# Patient Record
Sex: MALE | Race: BLACK OR AFRICAN AMERICAN | NOT HISPANIC OR LATINO | Employment: UNEMPLOYED | URBAN - METROPOLITAN AREA
[De-identification: names, ages, dates, MRNs, and addresses within clinical notes are randomized per-mention and may not be internally consistent; named-entity substitution may affect disease eponyms.]

---

## 2019-03-30 ENCOUNTER — APPOINTMENT (OUTPATIENT)
Dept: RADIOLOGY | Facility: MEDICAL CENTER | Age: 29
End: 2019-03-30
Attending: EMERGENCY MEDICINE
Payer: COMMERCIAL

## 2019-03-30 ENCOUNTER — HOSPITAL ENCOUNTER (EMERGENCY)
Facility: MEDICAL CENTER | Age: 29
End: 2019-03-30
Attending: EMERGENCY MEDICINE
Payer: COMMERCIAL

## 2019-03-30 VITALS
RESPIRATION RATE: 15 BRPM | DIASTOLIC BLOOD PRESSURE: 79 MMHG | SYSTOLIC BLOOD PRESSURE: 135 MMHG | TEMPERATURE: 97.6 F | HEART RATE: 78 BPM | BODY MASS INDEX: 21.48 KG/M2 | HEIGHT: 71 IN | WEIGHT: 153.44 LBS | OXYGEN SATURATION: 98 %

## 2019-03-30 DIAGNOSIS — S61.511A LACERATION OF RIGHT WRIST, INITIAL ENCOUNTER: ICD-10-CM

## 2019-03-30 DIAGNOSIS — S64.11XA INJURY OF RIGHT MEDIAN NERVE AT WRIST, INITIAL ENCOUNTER: ICD-10-CM

## 2019-03-30 PROCEDURE — 303747 HCHG EXTRA SUTURE

## 2019-03-30 PROCEDURE — 304217 HCHG IRRIGATION SYSTEM

## 2019-03-30 PROCEDURE — 99284 EMERGENCY DEPT VISIT MOD MDM: CPT

## 2019-03-30 PROCEDURE — 73100 X-RAY EXAM OF WRIST: CPT | Mod: RT

## 2019-03-30 PROCEDURE — 303485 HCHG DRESSING MEDIUM

## 2019-03-30 PROCEDURE — 700101 HCHG RX REV CODE 250: Performed by: EMERGENCY MEDICINE

## 2019-03-30 PROCEDURE — A9270 NON-COVERED ITEM OR SERVICE: HCPCS | Performed by: EMERGENCY MEDICINE

## 2019-03-30 PROCEDURE — 304999 HCHG REPAIR-SIMPLE/INTERMED LEVEL 1

## 2019-03-30 PROCEDURE — 700102 HCHG RX REV CODE 250 W/ 637 OVERRIDE(OP): Performed by: EMERGENCY MEDICINE

## 2019-03-30 PROCEDURE — 90471 IMMUNIZATION ADMIN: CPT

## 2019-03-30 PROCEDURE — 700111 HCHG RX REV CODE 636 W/ 250 OVERRIDE (IP): Performed by: EMERGENCY MEDICINE

## 2019-03-30 PROCEDURE — 73110 X-RAY EXAM OF WRIST: CPT | Mod: RT

## 2019-03-30 PROCEDURE — 90715 TDAP VACCINE 7 YRS/> IM: CPT | Performed by: EMERGENCY MEDICINE

## 2019-03-30 RX ORDER — CEPHALEXIN 500 MG/1
500 CAPSULE ORAL ONCE
Status: COMPLETED | OUTPATIENT
Start: 2019-03-30 | End: 2019-03-30

## 2019-03-30 RX ORDER — HYDROCODONE BITARTRATE AND ACETAMINOPHEN 5; 325 MG/1; MG/1
1 TABLET ORAL ONCE
Status: COMPLETED | OUTPATIENT
Start: 2019-03-30 | End: 2019-03-30

## 2019-03-30 RX ORDER — CEPHALEXIN 500 MG/1
500 CAPSULE ORAL 4 TIMES DAILY
Qty: 28 CAP | Refills: 0 | Status: SHIPPED | OUTPATIENT
Start: 2019-03-30 | End: 2019-04-06

## 2019-03-30 RX ORDER — LIDOCAINE HYDROCHLORIDE AND EPINEPHRINE 10; 10 MG/ML; UG/ML
10 INJECTION, SOLUTION INFILTRATION; PERINEURAL ONCE
Status: COMPLETED | OUTPATIENT
Start: 2019-03-30 | End: 2019-03-30

## 2019-03-30 RX ADMIN — CLOSTRIDIUM TETANI TOXOID ANTIGEN (FORMALDEHYDE INACTIVATED), CORYNEBACTERIUM DIPHTHERIAE TOXOID ANTIGEN (FORMALDEHYDE INACTIVATED), BORDETELLA PERTUSSIS TOXOID ANTIGEN (GLUTARALDEHYDE INACTIVATED), BORDETELLA PERTUSSIS FILAMENTOUS HEMAGGLUTININ ANTIGEN (FORMALDEHYDE INACTIVATED), BORDETELLA PERTUSSIS PERTACTIN ANTIGEN, AND BORDETELLA PERTUSSIS FIMBRIAE 2/3 ANTIGEN 0.5 ML: 5; 2; 2.5; 5; 3; 5 INJECTION, SUSPENSION INTRAMUSCULAR at 15:52

## 2019-03-30 RX ADMIN — CEPHALEXIN 500 MG: 500 CAPSULE ORAL at 17:11

## 2019-03-30 RX ADMIN — LIDOCAINE HYDROCHLORIDE,EPINEPHRINE BITARTRATE 10 ML: 10; .01 INJECTION, SOLUTION INFILTRATION; PERINEURAL at 15:00

## 2019-03-30 RX ADMIN — HYDROCODONE BITARTRATE AND ACETAMINOPHEN 1 TABLET: 5; 325 TABLET ORAL at 15:21

## 2019-03-30 NOTE — ED NOTES
Agree with triage notes. poc explained to pt.  Xray at bedside. Medicated for pain per mar order. Allergies verified.

## 2019-03-30 NOTE — ED TRIAGE NOTES
"Chief Complaint   Patient presents with   • T-5000 Lacerations     Pt reports that he was cut by a beer bottle while throwing out the trash at work. Lac to right wrist. Pressure dressing in place.  Blood pressure 135/89, pulse 78, temperature 36.4 °C (97.6 °F), temperature source Temporal, resp. rate 14, height 1.803 m (5' 11\"), weight 69.6 kg (153 lb 7 oz), SpO2 98 %.    Pt informed of wait times. Educated on triage process.  Asked to return to triage RN for any new or worsening of symptoms. Thanked for patience.        "

## 2019-03-30 NOTE — LETTER
"  FORM C-4:  EMPLOYEE’S CLAIM FOR COMPENSATION/ REPORT OF INITIAL TREATMENT  EMPLOYEE’S CLAIM - PROVIDE ALL INFORMATION REQUESTED   First Name  Du Last Name  Blane Birthdate             Age  1990 28 y.o. Sex  male Claim Number   Home Employee Address  [address]  [facility]                                     Zip  [address] Height  1.803 m (5' 11\") Weight  69.6 kg (153 lb 7 oz) [facility]     Mailing Employee Address                           [address]   [place]               Zip  [address] Telephone  [phone] (home)  Primary Language Spoken  ENGLISH   Insurer   Third Party   LIDYA/JACQUES SAWYER Employee's Occupation (Job Title) When Injury or Occupational Disease Occurred     Employer's Name  [employer] Telephone  [phone]    Employer Address  [employer] [address] [place] [address] [29] Zip  [address]   Date of Injury  3/30/2019       Hour of Injury  1:45 PM Date Employer Notified  3/30/2019 Last Day of Work after Injury or Occupational Disease  3/30/2019 Supervisor to Whom Injury Reported  Shola Licona   Address or Location of Accident (if applicable)  [[facility] [address]]   What were you doing at the time of accident? (if applicable)  Throwing trash away with glass inside     How did this injury or occupational disease occur? Be specific and answer in detail. Use additional sheet if necessary)  I was cleaning a room and went to take the trash out not noticing the corona bottle at the bottom. As I went to open the trash with on hand and throwing away with the other and   the bottle cut my wrist   If you believe that you have an occupational disease, when did you first have knowledge of the disability and it relationship to your employment?  n/a Witnesses to the Accident  N/A     Nature of Injury or Occupational Disease  Laceration  Part(s) of Body Injured or Affected  Wrist (L) and Hand (L), N/A, N/A    I certify " that the above is true and correct to the best of my knowledge and that I have provided this information in order to obtain the benefits of [place] or [place] of [place]).  I hereby authorize any physician, chiropractor, surgeon, practitioner, or other person, any hospital, including [facility] or [facility], any medical service organization, any insurance company, or other institution or organization to release to each other, any medical or other information, including benefits paid or payable, pertinent to this injury or disease, except information relative to diagnosis, treatment and/or counseling for AIDS, psychological conditions, alcohol or controlled substances, for which I must give specific authorization.  A Photostat of this authorization shall be as valid as the original.   Date 03/30/2019 Place  [facility] Employee’s Signature   THIS REPORT MUST BE COMPLETED AND MAILED WITHIN 3 WORKING DAYS OF TREATMENT   Place  [facility], EMERGENCY DEPT  Name of Facility   [facility]   Date  3/30/2019 Diagnosis  (S61.511A) Laceration of right wrist, initial encounter  (S64.11XA) Injury of right median nerve at wrist, initial encounter Is there evidence the injured employee was under the influence of alcohol and/or another controlled substance at the time of accident?   Hour  6:45 PM Description of Injury or Disease  Laceration of right wrist, initial encounter  Injury of right median nerve at wrist, initial encounter No   Treatment  Update tetanus clean and closed wound.  X-ray to evaluate foreign body.  Removed foreign body.  Closed wound.  Identify possible nerve injury  Have you advised the patient to remain off work five days or more?         No   X-Ray Findings  Positive   If Yes   From Date    To Date      From information given by the employee, together with medical evidence,  "can you directly connect this injury or occupational disease as job incurred?  Yes If No, is the employee capable of: Full Duty  No Modified Duty  Yes   Is additional medical care by a physician indicated?  Yes If Modified Duty, Specify any Limitations / Restrictions  Must keep hand in splint.     Do you know of any previous injury or disease contributing to this condition or occupational disease?  No   Date  3/30/2019 Print Doctor’s Name  Dileep Greenfield certify the employer’s copy of this form was mailed on:   Address  [address] [address] [phone] Insurer’s Use Only   [facility]  [address]    Provider’s Tax ID Number  [id number] Telephone  Dept: [phone]    Doctor’s Signature  e-DILEEP Tolliver M.D. Degree   MD    Original - TREATING PHYSICIAN OR CHIROPRACTOR   Pg 2-Insurer/TPA   Pg 3-Employer   Pg 4-Employee                                                                                                  Form C-4 (rev01/03)     BRIEF DESCRIPTION OF RIGHTS AND BENEFITS  (Pursuant to [place]RS 616C.050)    Notice of Injury or Occupational Disease (Incident Report Form C-1): If an injury or occupational disease (OD) arises out of and in the course of employment, you must provide written notice to your employer as soon as practicable, but no later than 7 days after the accident or OD. Your employer shall maintain a sufficient supply of the required forms.  Claim for Compensation (Form C-4): If medical treatment is sought, the form C-4 is available at the place of initial treatment. A completed \"Claim for Compensation\" (Form C-4) must be filed within 90 days after an accident or OD. The treating physician or chiropractor must, within 3 working days after treatment, complete and mail to the employer, the employer's insurer and third-party , the Claim for Compensation.  Medical Treatment: If you require medical treatment for your on-the-job injury or OD, you may " be required to select a physician or chiropractor from a list provided by your workers’ compensation insurer, if it has contracted with an Organization for Managed Care (MCO) or Preferred Provider Organization (PPO) or providers of health care. If your employer has not entered into a contract with an MCO or PPO, you may select a physician or chiropractor from the Panel of Physicians and Chiropractors. Any medical costs related to your industrial injury or OD will be paid by your insurer.  Temporary Total Disability (TTD): If your doctor has certified that you are unable to work for a period of at least 5 consecutive days, or 5 cumulative days in a 20-day period, or places restrictions on you that your employer does not accommodate, you may be entitled to TTD compensation.  Temporary Partial Disability (TPD): If the wage you receive upon reemployment is less than the compensation for TTD to which you are entitled, the insurer may be required to pay you TPD compensation to make up the difference. TPD can only be paid for a maximum of 24 months.  Permanent Partial Disability (PPD): When your medical condition is stable and there is an indication of a PPD as a result of your injury or OD, within 30 days, your insurer must arrange for an evaluation by a rating physician or chiropractor to determine the degree of your PPD. The amount of your PPD award depends on the date of injury, the results of the PPD evaluation and your age and wage.  Permanent Total Disability (PTD): If you are medically certified by a treating physician or chiropractor as permanently and totally disabled and have been granted a PTD status by your insurer, you are entitled to receive monthly benefits not to exceed 66 2/3% of your average monthly wage. The amount of your PTD payments is subject to reduction if you previously received a PPD award.  Vocational Rehabilitation Services: You may be eligible for vocational rehabilitation services if you are  unable to return to the job due to a permanent physical impairment or permanent restrictions as a result of your injury or occupational disease.  Transportation and Per Renita Reimbursement: You may be eligible for travel expenses and per renita associated with medical treatment.  Reopening: You may be able to reopen your claim if your condition worsens after claim closure.  Appeal Process: If you disagree with a written determination issued by the insurer or the insurer does not respond to your request, you may appeal to the Department of Administration, , by following the instructions contained in your determination letter. You must appeal the determination within 70 days from the date of the determination letter at [address], [place], [place] [address], or [address], [place], [place] [address]. If you disagree with the  decision, you may appeal to the Department of Administration, . You must file your appeal within 30 days from the date of the  decision letter at [address], [place], [place] [address], or [address] [facility] [address], [place] [address]. If you disagree with a decision of an , you may file a petition for judicial review with the District Court. You must do so within 30 days of the Appeal Officer’s decision. You may be represented by an  at your own expense or you may contact the [facility] for possible representation.  [place]  for Injured Workers (NAIW): If you disagree with a  decision, you may request that NAIW represent you without charge at an  Hearing. For information regarding denial of benefits, you may contact the [facility] at: [address], [address], [place] [address], [phone], or [address], [place] [address], [phone]  To File a Complaint with the Division: If  you wish to file a complaint with the  of the Division of Industrial Relations (DIR), please contact the Workers’ Compensation Section, [address], [address], [place], [place] [address], telephone (750) [phone], or [address], [address], [place], [place] [address], telephone [phone]) [phone].  For assistance with Workers’ Compensation Issues: you may contact the Office of the Governor Consumer Health Assistance, [address] [place] [address], [address], [place], [place] [address], Toll Free 1-234.790.3130, Web site: http://1234ENTER.[facility].nv., E-mail [email or web address].[email or web address].                                                                                                                                                                               __________________________________________________________________                                    03/30/2019            Employee Name / Signature                                                                                                                            Date                                       D-2 (rev. 10/07)

## 2019-03-30 NOTE — ED PROVIDER NOTES
"ED Provider Note    Scribed for Dileep Greenfield M.D. by Melina Ortega. 3/30/2019, 2:56 PM.    Primary care provider: None noted  Means of arrival: walk in  History obtained from: patient  History limited by: none    CHIEF COMPLAINT  Chief Complaint   Patient presents with   • T-5000 Lacerations       HPI  Du Arguelles is a 28 y.o. male who presents to the Emergency Department for evaluation of a right wrist laceration that he sustained just prior to arrival. He explains that he was throwing away trash with glass bottles in it while at work and one of the bottles broke and cut his right wrist. There is no associated active bleeding at this time. He has no other medical complaints. He has no history of medical problems or allergies. Patient's tetanus vaccination is not up to date.    REVIEW OF SYSTEMS  Review of Systems   Skin:        Positive for laceration. Negative for active bleeding.      PAST MEDICAL HISTORY       SURGICAL HISTORY  patient denies any surgical history    SOCIAL HISTORY  Social History   Substance Use Topics   • Smoking status: Never Smoker   • Smokeless tobacco: Never Used   • Alcohol use No      History   Drug Use     Comment: marijuana       FAMILY HISTORY  History reviewed. No pertinent family history.    CURRENT MEDICATIONS  No current facility-administered medications for this encounter.     Current Outpatient Prescriptions:   •  cephALEXin (KEFLEX) 500 MG Cap, Take 1 Cap by mouth 4 times a day for 7 days., Disp: 28 Cap, Rfl: 0    ALLERGIES  No Known Allergies    PHYSICAL EXAM  VITAL SIGNS: /89   Pulse 78   Temp 36.4 °C (97.6 °F) (Temporal)   Resp 14   Ht 1.803 m (5' 11\")   Wt 69.6 kg (153 lb 7 oz)   SpO2 98%   BMI 21.40 kg/m²   Vitals reviewed.  Constitutional: Well developed, Well nourished, No acute distress, Non-toxic appearance.   HENT: Normocephalic, Atraumatic  Musculoskeletal: Right volar wrist has about a 3 cm laceration.  Tracks down and through appears to " be the flexor retinaculum of the wrist.  The palmaris longus appears intact.  There may be an area of or tendon injury see the separate and the pelvis and nerve or tendon.  He has normal distal perfusion normal distal tendon function.  He has normal two-point discrimination in the index long ring and small finger but diminished two-point discrimination in the thumb.  Also over the palm near the thumb and over the thenar eminence.  Areas of decreased sensation and abnormal two-point discrimination   Neurologic: Alert, Normal motor function, Normal sensory function, No focal deficits noted.   Psychiatric: Affect normal    RADIOLOGY  DX-WRIST-LIMITED 2- RIGHT   Final Result      No residual right wrist subcutaneous foreign body.      DX-WRIST-COMPLETE 3+ RIGHT   Final Result      1.  No evidence of fracture or dislocation.      2.  1 mm fragment likely representing foreign body identified at site of laceration in the anterior soft tissues of the wrist.        The radiologist's interpretation of all radiological studies have been reviewed by me.    Laceration Repair Procedure Note    Indication: Laceration    Procedure: The patient was placed in the appropriate position and anesthesia around the laceration was obtained by infiltration using 1% Lidocaine with epinephrine. The area was then cleansed with betadine and draped in a sterile fashion, irrigated with normal saline and explored with no foreign bodies discovered. The laceration was closed with 3-0 Ethilon using interrupted sutures. There were no additional lacerations requiring repair. The wound area was then dressed with bacitracin.      Total repaired wound length: 4 cm.     Other Items: Suture count: 5    The patient tolerated the procedure well.    Complications: None      COURSE & MEDICAL DECISION MAKING  Pertinent Labs & Imaging studies reviewed. (See chart for details)    2:56 PM Patient seen and examined at bedside. The patient presents with a laceration  to his right wrist, and the differential diagnosis includes but is not limited to tendon injury, laceration, possible nerve injury.. Ordered for Dx-wrist to evaluate. Patient will be treated with 0.5 ml adacel and one tab of 5-325 norco for his symptoms.      3:53 PM Patient numbed with 1% lidocaine with epinephrine prior to irrigation.    4:20 PM Paged orthopedics.     4:30 PM Spoke to Dr. Oleary (orthopedics) reviewed the possibility of violation of flexor retinaculum.  Possible nerve injury abnormal two-point discrimination possible seen a severed tendon within the wound.  He is aware of this.  He agrees to see the patient as an outpatient recommended close it for now put in a splint antibiotics and arrange follow-up.    X-ray showed a foreign body.  After irrigation and probing and some cleaning.  X-rays obtained after closure there is no residual foreign body.    4:37 PM Patient reevaluated at bedside. He has no new complaints at this time. Explained that he has a nerve injury or possibly a tendon injury and will need to follow up with Dr. Oleary (orthopedics) after he is discharged. Informed that his laceration will be repaired at this time. Patient understands and agrees to plan of care. Ordered repeat Dx-wrist.    5    6:23 PM Patient reevaluated at bedside. Explained that he will be discharged at this time. Advised to follow up with Orthopedics as soon as possible. Patient understands and agrees to be discharged at this time.     This was an on-the-job injury.  Patient was also referred to occupational health    The patient will return for new or worsening symptoms and is stable at the time of discharge.  The patient is referred to a primary physician for blood pressure management, diabetic screening, and for all other preventative health concerns.    DISPOSITION:  Patient will be discharged home in stable condition.    FOLLOW UP:  [address]  [address]  [place]  [place] [address]  [phone]  Schedule an appointment as soon as possible for a visit in 2 days      Memo Oleary M.D.  [address] [place] [address]  [phone]    Schedule an appointment as soon as possible for a visit in 2 days        OUTPATIENT MEDICATIONS:  New Prescriptions    CEPHALEXIN (KEFLEX) 500 MG CAP    Take 1 Cap by mouth 4 times a day for 7 days.     FINAL IMPRESSION  1. Laceration of right wrist, initial encounter    2. Injury of right median nerve at wrist, initial encounter          IMelina (Scribe), am scribing for, and in the presence of, Dileep Greenfield M.D..    Electronically signed by: Melina Ortega (Scribe), 3/30/2019    IDileep M.D. personally performed the services described in this documentation, as scribed by Melina Ortega in my presence, and it is both accurate and complete. E    The note accurately reflects work and decisions made by me.  Dileep Greenfield  3/30/2019  10:00 PM

## 2019-03-31 NOTE — DISCHARGE INSTRUCTIONS
Call Dr. Price for follow-up at [phone].  He likely have an injury to your nerve and possibly a tendon.  He will need further care and probably surgery.  Also, follow-up occupational health.  Return for pain swelling redness or other concerns.  Take antibiotics as prescribed.  Keep clean dry and dressed and in a splint.

## 2019-03-31 NOTE — ED NOTES
Laceration in right wrist repaired by erp. Bleeding controlled.  Splint applied by ed tech in right wrist/arm. CMS intact.  Discharge instructions given to pt including follow up with orthopaedic doctor and [facility] or returning if no improvement of symptoms or to return if worse. Prescription x 1 provided to pt. Questions answered by RN. Denies any new complaints. Discharged w/stable vitals and able to ambulate w/steady gait.

## 2019-04-25 ENCOUNTER — HOSPITAL ENCOUNTER (EMERGENCY)
Facility: MEDICAL CENTER | Age: 29
End: 2019-04-25
Payer: COMMERCIAL

## 2019-04-25 VITALS
DIASTOLIC BLOOD PRESSURE: 71 MMHG | OXYGEN SATURATION: 99 % | HEART RATE: 82 BPM | WEIGHT: 151.9 LBS | SYSTOLIC BLOOD PRESSURE: 160 MMHG | TEMPERATURE: 98.6 F | BODY MASS INDEX: 21.19 KG/M2 | RESPIRATION RATE: 14 BRPM

## 2019-04-25 NOTE — ED TRIAGE NOTES
PT was coming for a follow up for a work related injury, pt was trying to be seen at occupational health, pt given number and directions to [facility]

## 2019-05-06 ENCOUNTER — TELEPHONE (OUTPATIENT)
Dept: OCCUPATIONAL MEDICINE | Facility: CLINIC | Age: 29
End: 2019-05-06

## 2019-05-07 ENCOUNTER — OCCUPATIONAL MEDICINE (OUTPATIENT)
Dept: OCCUPATIONAL MEDICINE | Facility: CLINIC | Age: 29
End: 2019-05-07
Payer: COMMERCIAL

## 2019-05-07 VITALS
RESPIRATION RATE: 14 BRPM | WEIGHT: 151 LBS | SYSTOLIC BLOOD PRESSURE: 120 MMHG | DIASTOLIC BLOOD PRESSURE: 72 MMHG | OXYGEN SATURATION: 100 % | TEMPERATURE: 98.1 F | HEIGHT: 71 IN | BODY MASS INDEX: 21.14 KG/M2 | HEART RATE: 61 BPM

## 2019-05-07 DIAGNOSIS — S61.511S LACERATION OF RIGHT WRIST, SEQUELA: ICD-10-CM

## 2019-05-07 PROCEDURE — 99204 OFFICE O/P NEW MOD 45 MIN: CPT | Performed by: PREVENTIVE MEDICINE

## 2019-05-07 NOTE — LETTER
[address],   [address] [place] [address]  Phone:  [phone] - Fax:  [phone]   [facility] Progress Report and Disability Certification  Date of Service: 5/7/2019   No Show:  No  Date / Time of Next Visit: 5/21/2019 @ 11:15 AM   Claim Information   Patient Name: Du Arguelles  Claim Number:     Employer: [employer]  Date of Injury: 3/30/2019     Insurer / TPA: Lois Toledo  ID / SSN:     Occupation:   Diagnosis: The encounter diagnosis was Laceration of left wrist, sequela.    Medical Information   Related to Industrial Injury? Yes    Subjective Complaints:  DOI 3/30/2019: 29 yo male presents with left wrist laceration. He was throwing away trash with glass bottles in it while at work and one of the bottles broke and cut his right wrist.  He was seen in the emergency department, hand surgery was consulted and advised closing the laceration follow-up with hand surgery.  Patient states there was miscommunication he is not followed up with hand surgery yet.  He notes some tenderness over the laceration.  He notes occasional shooting pain up into the third digit and sometimes to the base of the thumb.  He also sometimes get numbness/tingling in this area as well.  Patient states that his right hand feels weak.  He has been using a wrist brace most of the day to help with pain.  Notes increased pain with frequent use of the hand.  He states when he wakes in the morning it feels stiff.  Denies prior left wrist injuries.   Objective Findings: Left wrist: Well-healed laceration over the volar wrist, near the carpal retinaculum.  Full range of motion of wrist and all digits.  Tenderness over the volar wrist.   strength slightly diminished.  Some pain with resisted pronation and wrist flexion/extension.  Tinel's positive.  Phalen's positive.   Pre-Existing Condition(s):     Assessment:   Condition Same    Status:  Additional Care Required  Permanent Disability:No    Plan:      Diagnostics:      Comments:  Given symptoms and description of laceration in the ER note recommend follow-up with hand surgery  Placed referral for hand surgery  Use wrist brace as needed  OTC ibuprofen as needed  Restricted duty  Follow-up 2 weeks    Disability Information   Status: Released to Restricted Duty    From:  5/7/2019  Through: 5/21/2019 Restrictions are: Temporary   Physical Restrictions   Sitting:    Standing:    Stooping:    Bending:      Squatting:    Walking:    Climbing:    Pushing:      Pulling:    Other:    Reaching Above Shoulder (L):   Reaching Above Shoulder (R):       Reaching Below Shoulder (L):    Reaching Below Shoulder (R):      Not to exceed Weight Limits   Carrying(hrs):   Weight Limit(lb):   Lifting(hrs):   Weight  Limit(lb):     Comments: Less than 5 pounds lift/push/pull with right hand.  Limited forceful gripping, grasping and pinching.    Repetitive Actions   Hands: i.e. Fine Manipulations from Grasping:     Feet: i.e. Operating Foot Controls:     Driving / Operate Machinery:     Physician Name: Octavio Mondragon D.O. Physician Signature: OCTAVIO Greenwood D.O. e-Signature: Dr. Josh Solorio, Medical Director   Clinic Name / Location: [address],   [address] [place] [address] Clinic Phone Number: Dept: [phone]   Appointment Time: 1:30 Pm Visit Start Time: 1:56 PM   Check-In Time:  1:50 Pm Visit Discharge Time:  2:23 PM   Original-Treating Physician or Chiropractor    Page 2-Insurer/TPA    Page 3-Employer    Page 4-Employee

## 2019-05-20 ENCOUNTER — TELEPHONE (OUTPATIENT)
Dept: OCCUPATIONAL MEDICINE | Facility: CLINIC | Age: 29
End: 2019-05-20

## 2019-05-21 ENCOUNTER — OCCUPATIONAL MEDICINE (OUTPATIENT)
Dept: OCCUPATIONAL MEDICINE | Facility: CLINIC | Age: 29
End: 2019-05-21
Payer: COMMERCIAL

## 2019-05-21 VITALS
BODY MASS INDEX: 21.14 KG/M2 | HEIGHT: 71 IN | WEIGHT: 151 LBS | HEART RATE: 81 BPM | DIASTOLIC BLOOD PRESSURE: 80 MMHG | OXYGEN SATURATION: 100 % | RESPIRATION RATE: 14 BRPM | TEMPERATURE: 97.9 F | SYSTOLIC BLOOD PRESSURE: 120 MMHG

## 2019-05-21 DIAGNOSIS — S61.511S LACERATION OF RIGHT WRIST, SEQUELA: ICD-10-CM

## 2019-05-21 PROCEDURE — 99213 OFFICE O/P EST LOW 20 MIN: CPT | Performed by: PREVENTIVE MEDICINE

## 2019-05-21 ASSESSMENT — ENCOUNTER SYMPTOMS
TINGLING: 1
SENSORY CHANGE: 1

## 2019-05-21 NOTE — PROGRESS NOTES
"Subjective:      Du Arguelles is a 28 y.o. male who presents with Other ([facility] DOI 3/30/19 (R) Wrist- feeling the same)      DOI 3/30/2019: 29 yo male presents with left wrist laceration. He was throwing away trash with glass bottles in it while at work and one of the bottles broke and cut his right wrist. Patient states that overall right wrist pain is about the same.  Pain continues to be mostly on the volar aspect.  He states that he tried using the brace less which made the wrist less stiff but increase the numbness and tingling.  Numbness and tingling is worse with frequent use of the hand.  He is in the process of setting up the appointment with Dr. Ernst.     HPI    Review of Systems   Neurological: Positive for tingling and sensory change.     SOCHX: Works as a  at [employer]   FH: No pertinent family history to this problem.     Objective:     /80   Pulse 81   Temp 36.6 °C (97.9 °F)   Resp 14   Ht 1.803 m (5' 11\")   Wt 68.5 kg (151 lb)   SpO2 100%   BMI 21.06 kg/m²      Physical Exam   Constitutional: He is oriented to person, place, and time. He appears well-developed and well-nourished.   Cardiovascular: Normal rate.    Pulmonary/Chest: Effort normal.   Neurological: He is alert and oriented to person, place, and time.   Skin: Skin is warm and dry.   Psychiatric: He has a normal mood and affect. His behavior is normal. Judgment normal.       Left wrist: Well-healed laceration over the volar wrist, near the carpal retinaculum.  Full range of motion of wrist and all digits.  Tenderness over the volar wrist.         Assessment/Plan:     1. Laceration of right wrist, sequela  Make appointment Dr. Ernst  Use wrist brace as needed  OTC ibuprofen as needed  Follow-up as needed  Restricted duty until cleared by orthopedics        "

## 2019-05-21 NOTE — LETTER
[address],   [address] [place] [address]  Phone:  [phone] - Fax:  [phone]   Occupational Health [facility] Progress Report and Disability Certification  Date of Service: 5/21/2019   No Show:  No  Date / Time of Next Visit:  [facility]   Claim Information   Patient Name: Du Arguelles  Claim Number:     Employer: [employer]  Date of Injury: 3/30/2019     Insurer / TPA: [employer] [place]  ID / SSN:     Occupation:   Diagnosis: The encounter diagnosis was Laceration of right wrist, sequela.    Medical Information   Related to Industrial Injury? Yes    Subjective Complaints:  DOI 3/30/2019: 27 yo male presents with left wrist laceration. He was throwing away trash with glass bottles in it while at work and one of the bottles broke and cut his right wrist. Patient states that overall right wrist pain is about the same.  Pain continues to be mostly on the volar aspect.  He states that he tried using the brace less which made the wrist less stiff but increase the numbness and tingling.  Numbness and tingling is worse with frequent use of the hand.  He is in the process of setting up the appointment with Dr. Ernst.   Objective Findings: Left wrist: Well-healed laceration over the volar wrist, near the carpal retinaculum.  Full range of motion of wrist and all digits.  Tenderness over the volar wrist.     Pre-Existing Condition(s):     Assessment:   Condition Same    Status: Discharged / Care Transfer  Permanent Disability:No    Plan:      Diagnostics:      Comments:  Make appointment Dr. Ernst  Use wrist brace as needed  OTC ibuprofen as needed  Follow-up as needed  Restricted duty until cleared by orthopedics    Disability Information   Status: Released to Restricted Duty    From:  5/21/2019  Through:   Restrictions are: Temporary   Physical Restrictions   Sitting:    Standing:    Stooping:    Bending:      Squatting:   Walking:    Climbing:    Pushing:      Pulling:    Other:    Reaching Above Shoulder (L):   Reaching Above Shoulder (R):       Reaching Below Shoulder (L):    Reaching Below Shoulder (R):      Not to exceed Weight Limits   Carrying(hrs):   Weight Limit(lb):   Lifting(hrs):   Weight  Limit(lb):     Comments: Less than 5 pounds lift/push/pull with right hand.  Limited forceful gripping, grasping and pinching.  Until cleared by orthopedics    Repetitive Actions   Hands: i.e. Fine Manipulations from Grasping:     Feet: i.e. Operating Foot Controls:     Driving / Operate Machinery:     Physician Name: Octavio Mondragon D.O. Physician Signature: OCTAVIO Greenwood D.O. e-Signature: Dr. Josh Solorio, Medical Director   Clinic Name / Location: [address],   [address] [place] [id number] Clinic Phone Number: Dept: [phone]   Appointment Time: 11:15 Am Visit Start Time: 11:23 AM   Check-In Time:  11:21 Am Visit Discharge Time: 11:34 AM   Original-Treating Physician or Chiropractor    Page 2-Insurer/TPA    Page 3-Employer    Page 4-Employee

## 2020-04-06 ENCOUNTER — HOSPITAL ENCOUNTER (EMERGENCY)
Facility: MEDICAL CENTER | Age: 30
End: 2020-04-06
Attending: EMERGENCY MEDICINE
Payer: COMMERCIAL

## 2020-04-06 VITALS
DIASTOLIC BLOOD PRESSURE: 83 MMHG | RESPIRATION RATE: 16 BRPM | HEART RATE: 102 BPM | TEMPERATURE: 98.3 F | HEIGHT: 70 IN | SYSTOLIC BLOOD PRESSURE: 141 MMHG | BODY MASS INDEX: 21.43 KG/M2 | WEIGHT: 149.69 LBS | OXYGEN SATURATION: 97 %

## 2020-04-06 DIAGNOSIS — R36.9 PENILE DISCHARGE: ICD-10-CM

## 2020-04-06 LAB
APPEARANCE UR: CLEAR
BACTERIA #/AREA URNS HPF: NEGATIVE /HPF
BILIRUB UR QL STRIP.AUTO: NEGATIVE
COLOR UR: YELLOW
EPI CELLS #/AREA URNS HPF: NEGATIVE /HPF
GLUCOSE UR STRIP.AUTO-MCNC: NEGATIVE MG/DL
HYALINE CASTS #/AREA URNS LPF: ABNORMAL /LPF
KETONES UR STRIP.AUTO-MCNC: NEGATIVE MG/DL
LEUKOCYTE ESTERASE UR QL STRIP.AUTO: ABNORMAL
MICRO URNS: ABNORMAL
NITRITE UR QL STRIP.AUTO: NEGATIVE
PH UR STRIP.AUTO: 5.5 [PH] (ref 5–8)
PROT UR QL STRIP: NEGATIVE MG/DL
RBC # URNS HPF: ABNORMAL /HPF
RBC UR QL AUTO: NEGATIVE
SP GR UR STRIP.AUTO: 1.02
UROBILINOGEN UR STRIP.AUTO-MCNC: 1 MG/DL
WBC #/AREA URNS HPF: ABNORMAL /HPF

## 2020-04-06 PROCEDURE — 99284 EMERGENCY DEPT VISIT MOD MDM: CPT

## 2020-04-06 PROCEDURE — A9270 NON-COVERED ITEM OR SERVICE: HCPCS | Performed by: EMERGENCY MEDICINE

## 2020-04-06 PROCEDURE — 87491 CHLMYD TRACH DNA AMP PROBE: CPT

## 2020-04-06 PROCEDURE — 700111 HCHG RX REV CODE 636 W/ 250 OVERRIDE (IP): Performed by: EMERGENCY MEDICINE

## 2020-04-06 PROCEDURE — 700101 HCHG RX REV CODE 250: Performed by: EMERGENCY MEDICINE

## 2020-04-06 PROCEDURE — 700102 HCHG RX REV CODE 250 W/ 637 OVERRIDE(OP): Performed by: EMERGENCY MEDICINE

## 2020-04-06 PROCEDURE — 87591 N.GONORRHOEAE DNA AMP PROB: CPT

## 2020-04-06 PROCEDURE — 96372 THER/PROPH/DIAG INJ SC/IM: CPT

## 2020-04-06 PROCEDURE — 81001 URINALYSIS AUTO W/SCOPE: CPT

## 2020-04-06 RX ORDER — CEFTRIAXONE SODIUM 250 MG/1
250 INJECTION, POWDER, FOR SOLUTION INTRAMUSCULAR; INTRAVENOUS ONCE
Status: COMPLETED | OUTPATIENT
Start: 2020-04-06 | End: 2020-04-06

## 2020-04-06 RX ORDER — AZITHROMYCIN 250 MG/1
1000 TABLET, FILM COATED ORAL ONCE
Status: COMPLETED | OUTPATIENT
Start: 2020-04-06 | End: 2020-04-06

## 2020-04-06 RX ADMIN — LIDOCAINE HYDROCHLORIDE 0.9 ML: 10 INJECTION, SOLUTION INFILTRATION; PERINEURAL at 14:01

## 2020-04-06 RX ADMIN — CEFTRIAXONE SODIUM 250 MG: 250 INJECTION, POWDER, FOR SOLUTION INTRAMUSCULAR; INTRAVENOUS at 14:01

## 2020-04-06 RX ADMIN — AZITHROMYCIN MONOHYDRATE 1000 MG: 250 TABLET ORAL at 14:01

## 2020-04-06 ASSESSMENT — LIFESTYLE VARIABLES: DO YOU DRINK ALCOHOL: NO

## 2020-04-06 NOTE — ED NOTES
Pt amb to 75 with steady gait, UA obtained and sent. Pt educated on POC and verbalized understanding.

## 2020-04-06 NOTE — DISCHARGE INSTRUCTIONS
Please follow-up with the [facility] above.  Call to schedule a follow-up appointment for complete recheck.  Please abstain from sexual intercourse for 7 days, and make sure that your symptoms completely resolved.  Return to the emergency department if you develop any new or worsening symptoms including pelvic pain, worsening discharge despite antibiotic therapy, fevers, burning with urination, or if you have any further concerns.

## 2020-04-06 NOTE — ED NOTES
Medicated per MAR.    Pt discharge to home.  Pt provided with discharge instructions.  Pt verbalized understanding, all questions answered.  VSS upon DC. Pt steady on feet upon DC.

## 2020-04-06 NOTE — LETTER
4/8/2020               Du Arguelles  [address]  [address]  [facility] [place] [facility] [address]        Dear Du (MR#[id number])    As we have been unable to contact you by phone, this letter is sent in regards to your, recent visit to the [facility] Emergency Department on 4/6/2020.  During the visit, tests were performed to assist the physician in a medical diagnosis.  A review of those tests requires that we notify you of the following:    Your culture test was positive for Gonorrhea, a sexually transmitted infection. This was already treated appropriately in the Emergency Department. .       Based on the above findings it is recommended that you avoid sexual activity until 7 days after treatment, and seek testing for the presence of additional sexually transmitted infections from the Health Department. Also, it is advised that you inform your sexual partner(s) within the previous 60 days of the above findings and direct them to the Health Department for testing. Should your symptoms progress, it is important that you follow up with your primary care physician, your local urgent care office, or return to the emergency department for further work up in order to prevent long term health issues.      Thank you for your cooperation in the matter.    Sincerely,  ED Culture Follow-Up Staff  Stefany Ferreira, PharmD, PharmD    [facility], Emergency Department  [address], [place] [address] [phone] (ED Culture Line)  [phone]

## 2020-04-06 NOTE — ED PROVIDER NOTES
"ED Provider Note    Chief Complaint:   Penile discharge    HPI:  Du Arguelles is a 29 y.o. male who presents with chief complaint of penile discomfort and discharge.  After urinating earlier today he noticed some mild discomfort.  He denies burning with urination, denies abnormal urine.  Mild discomfort resolved without intervention, however later in the day he noticed a small amount of abnormal discharge.  Denies testicular pain or swelling, denies penile swelling.  He is concerned that he may have contracted a sexually transmitted infection from a new partner.  He does know for certain if any of his partners were recently diagnosed with sexually transmitted infections.    He denies any abnormal rashes or lesions, denies fevers, denies any painless ulcers or painless rash.  He has no significant past medical history.  Reports no other concerns at this time.    Review of Systems:  See HPI for pertinent positives and negatives.     Past Medical History:       Social History:  Social History     Tobacco Use   • Smoking status: Never Smoker   • Smokeless tobacco: Never Used   Substance and Sexual Activity   • Alcohol use: Yes     Comment: raree   • Drug use: Yes     Types: Inhaled     Comment: marijuana   • Sexual activity: Not on file       Surgical History:  patient denies any surgical history    Current Medications:  Home Medications     Reviewed by Kathe Nur R.N. (Registered Nurse) on 04/06/20 at 1317  Med List Status: Complete   Medication Last Dose Status        Patient Luis Taking any Medications                       Allergies:  No Known Allergies    Physical Exam:  Vital Signs: /83   Pulse (!) 102   Temp 36.8 °C (98.3 °F) (Temporal)   Resp 16   Ht 1.778 m (5' 10\")   Wt 67.9 kg (149 lb 11.1 oz)   SpO2 97%   BMI 21.48 kg/m²   Constitutional: Alert, no acute distress  HENT: Normocephalic  Pulmonary: No respiratory distress, normal work of breathing  Abdomen: Nondistended  Skin: " Warm, dry, no visible rashes or lesions  Musculoskeletal: Normal range of motion in all extremities, no swelling or deformity noted  Neurologic: Alert, oriented, normal speech, normal motor function  Psychiatric: Normal and appropriate mood and affect    Medical records reviewed for continuity of care.  No recent visits for similar symptoms.    Labs:  Labs Reviewed   URINALYSIS - Abnormal; Notable for the following components:       Result Value    Leukocyte Esterase Moderate (*)     All other components within normal limits   URINE MICROSCOPIC (W/UA) - Abnormal; Notable for the following components:    WBC  (*)     RBC 0-2 (*)     All other components within normal limits   CHLAMYDIA/GC PCR URINE OR SWAB     ED Medications Administered:  Medications   cefTRIAXone (ROCEPHIN) injection 250 mg (250 mg Intramuscular Given 4/6/20 1401)   azithromycin (ZITHROMAX) tablet 1,000 mg (1,000 mg Oral Given 4/6/20 1401)   lidocaine (XYLOCAINE) 1 % injection 0.9 mL (0.9 mL Other Given 4/6/20 1401)     MDM:  Patient presents with symptoms consistent with sexually transmitted infection.  Urinalysis is negative for bacteria, positive for white blood cells which would also support sexually transmitted infection.  He was treated with Rocephin and azithromycin empirically.  GC and chlamydia testing is pending at this time.  He is counseled to abstain from sexual intercourse for 7 days and to verify that all symptoms have resolved.  He is counseled to follow-up with his primary care physician for complete recheck.  Return precautions were discussed with the patient, and provided in written form with the patient's discharge instructions.     This patient was not identified to have risk factors for COVID-19.  Personal protective equipment including N95 surgical respirator, goggles, and gloves were used during this encounter.     Blood pressure today is greater than 120/80, patient is instructed to follow up with primary care  provider for blood pressure recheck.    Disposition:  Discharge home in stable condition    Final Impression:  1. Penile discharge        Electronically signed by: Melina Ramirez MD, 4/6/2020 2:56 PM

## 2020-04-06 NOTE — ED TRIAGE NOTES
"Chief Complaint   Patient presents with   • Penile Discharge     Pt states he noticed discharge today.     /83   Pulse (!) 102   Temp 36.8 °C (98.3 °F) (Temporal)   Resp 16   Ht 1.778 m (5' 10\")   Wt 67.9 kg (149 lb 11.1 oz)   SpO2 97%   BMI 21.48 kg/m²     Pt ambulated into triage, mask in place. VS as above, NAD, encouraged to return to the triage nurse or tech with any new complaints or symptoms. Urine cup provided.  "

## 2020-04-07 LAB
C TRACH DNA SPEC QL NAA+PROBE: NEGATIVE
N GONORRHOEA DNA SPEC QL NAA+PROBE: POSITIVE
SPECIMEN SOURCE: ABNORMAL

## 2020-04-08 NOTE — ED NOTES
"ED Positive Culture Follow-up/Notification Note:    Date: 4/8/2020     Patient seen in the ED on 4/6/2020 for penile discharge.   1. Penile discharge       There are no discharge medications for this patient.  Ceftriaxone and azith administered in the ED.     Allergies: Patient has no known allergies.     Vitals:    04/06/20 1314 04/06/20 1316   BP: 141/83    Pulse: (Abnormal) 102    Resp: 16    Temp: 36.8 °C (98.3 °F)    TempSrc: Temporal    SpO2: 97%    Weight:  67.9 kg (149 lb 11.1 oz)   Height: 1.778 m (5' 10\")        Final cultures:   Results     Procedure Component Value Units Date/Time    Chlamydia/GC PCR Urine Or Swab [[id number]]  (Abnormal) Collected:  04/06/20 1331    Order Status:  Completed Specimen:  Genital from Urine, First Catch Updated:  04/07/20 1748     C. trachomatis by PCR Negative     N. gonorrhoeae by PCR POSITIVE     Source Urine    URINALYSIS [[id number]]  (Abnormal) Collected:  04/06/20 1331    Order Status:  Completed Specimen:  Genital from Urine, Clean Catch Updated:  04/06/20 1449     Color Yellow     Character Clear     Specific Gravity 1.022     Ph 5.5     Glucose Negative mg/dL      Ketones Negative mg/dL      Protein Negative mg/dL      Bilirubin Negative     Urobilinogen, Urine 1.0     Nitrite Negative     Leukocyte Esterase Moderate     Occult Blood Negative     Micro Urine Req Microscopic          Plan:   Isolated Gonorrhea appropriately treated in the ED. Unable to reach patient by phone, sent letter to inform of result,  on abstinence for 7 days after treatment, inform partners within the past 60 days and seeking additional STD screening.     Stefany Ferreira, PharmD    "

## 2022-10-06 ENCOUNTER — HOSPITAL ENCOUNTER (EMERGENCY)
Facility: MEDICAL CENTER | Age: 32
End: 2022-10-06
Attending: EMERGENCY MEDICINE
Payer: COMMERCIAL

## 2022-10-06 VITALS
BODY MASS INDEX: 22.31 KG/M2 | HEIGHT: 72 IN | OXYGEN SATURATION: 100 % | RESPIRATION RATE: 18 BRPM | WEIGHT: 164.68 LBS | HEART RATE: 65 BPM | SYSTOLIC BLOOD PRESSURE: 122 MMHG | DIASTOLIC BLOOD PRESSURE: 71 MMHG | TEMPERATURE: 97.8 F

## 2022-10-06 DIAGNOSIS — Z20.2 STD EXPOSURE: ICD-10-CM

## 2022-10-06 LAB
HIV 1+2 AB+HIV1 P24 AG SERPL QL IA: NORMAL
T PALLIDUM AB SER QL IA: NORMAL

## 2022-10-06 PROCEDURE — 86780 TREPONEMA PALLIDUM: CPT

## 2022-10-06 PROCEDURE — 99283 EMERGENCY DEPT VISIT LOW MDM: CPT

## 2022-10-06 PROCEDURE — 87389 HIV-1 AG W/HIV-1&-2 AB AG IA: CPT

## 2022-10-06 RX ORDER — METRONIDAZOLE 500 MG/1
500 TABLET ORAL 2 TIMES DAILY
Qty: 14 TABLET | Refills: 0 | Status: SHIPPED | OUTPATIENT
Start: 2022-10-06 | End: 2022-10-13

## 2022-10-06 NOTE — ED NOTES
Patient seen at bedside; they have no new complaints at this time and vital signs are stable. Patient given discharge paperwork and given opportunity to ask questions. Patient verbalized understanding of discharge instructions and return precautions. Patient ambulated from ED with steady gait.

## 2022-10-06 NOTE — ED NOTES
Patient ambulated from lobby with steady gait. Agree with triage assessment.     Chart up for ERP to see.

## 2022-10-06 NOTE — ED TRIAGE NOTES
"Chief Complaint   Patient presents with    Exposure to STD     Pt was diagnosed w/ \"trichomoniasis\" 4 months ago while w/ another partner and completed the full course of antibiotic. Pt is now w/ a new partner who is having \"std symptoms\". Pt does not have symptoms but is concerned that he infected his new partner.     BP (!) 149/84   Pulse 85   Temp 36.6 °C (97.8 °F) (Temporal)   Resp 17   Ht 1.829 m (6')   Wt 74.7 kg (164 lb 10.9 oz)   SpO2 99%   BMI 22.34 kg/m²     "

## 2022-10-06 NOTE — ED PROVIDER NOTES
"ED Provider Note    Scribed for Monroe Murdock M.D. by Yanique Carr. 10/6/2022  11:13 AM    Primary care provider: Pcp Not In Computer  Means of arrival: Walk in   History obtained from: Patient  History limited by: None    CHIEF COMPLAINT  Chief Complaint   Patient presents with    Exposure to STD     Pt was diagnosed w/ \"trichomoniasis\" 4 months ago while w/ another partner and completed the full course of antibiotic. Pt is now w/ a new partner who is having \"std symptoms\". Pt does not have symptoms but is concerned that he infected his new partner.       HPI  Du Arguelles is a 31 y.o. male who presents to the Emergency Department for evaluation of exposure to STD. Patient reports that his partner was diagnosed with trichomoniasis 4 months ago, and she finished the full course of antibiotics. Patient's partner was doing well following the antibiotics. At this time, Du was also given antibiotics (Flagyl). He finished the full course of the antibiotics. However, about three days ago, Du's partner started experiencing STD-like symptoms again including foul odorous discharge. She was seen in the ED three days ago and had a lab work up done. Patient endorses being sexually active with her recently. However, he is not experiencing symptoms. No alleviating factors were reported.      Review of Records: Patient's partner was seen in the ED on September 20th. She is BV positive and trichomoniasis negative.     REVIEW OF SYSTEMS  Pertinent negatives include no dysuria, back pain, or abdominal pain.   See HPI for further details.     PAST MEDICAL HISTORY  None noted.     SURGICAL HISTORY  patient denies any surgical history    SOCIAL HISTORY  Social History     Tobacco Use    Smoking status: Never    Smokeless tobacco: Never   Vaping Use    Vaping Use: Never used   Substance Use Topics    Alcohol use: Yes     Comment: raree    Drug use: Yes     Types: Inhaled     Comment: marijuana      Social History "     Substance and Sexual Activity   Drug Use Yes    Types: Inhaled    Comment: marijuana       FAMILY HISTORY  History reviewed. No pertinent family history.    CURRENT MEDICATIONS  Home Medications       Reviewed by Shola Quinones R.N. (Registered Nurse) on 10/06/22 at 1035  Med List Status: Partial     Medication Last Dose Status        Patient Luis Taking any Medications                           ALLERGIES  No Known Allergies    PHYSICAL EXAM  VITAL SIGNS: BP (!) 149/84   Pulse 85   Temp 36.6 °C (97.8 °F) (Temporal)   Resp 17   Ht 1.829 m (6')   Wt 74.7 kg (164 lb 10.9 oz)   SpO2 99%   BMI 22.34 kg/m²     Constitutional: Well developed, Well nourished, No acute distress, Non-toxic appearance.   HENT: Normocephalic, Atraumatic, Bilateral external ears normal, Oropharynx is clear mucous membranes are moist. No oral exudates or nasal discharge.   Neck: Normal range of motion, No tenderness, Supple, No stridor. No meningismus.  Skin: Normal without rash.   Back: No CVA or spinal tenderness. Non tender.   Neurologic: Alert & oriented x 3, Normal motor function, Normal sensory function, No focal deficits noted. Reflexes are normal.  Psychiatric: Affect normal, Judgment normal, Mood normal. There is no suicidal ideation or patient reported hallucinations.     DIAGNOSTIC STUDIES / PROCEDURES    LABS  Labs Reviewed   CHLAMYDIA/GC, PCR (URINE)   T.PALLIDUM AB OLLIE (SCREENING)   HIV AG/AB COMBO ASSAY SCREENING      All labs reviewed by me.    COURSE & MEDICAL DECISION MAKING  Nursing notes, VS, PMSFHx reviewed in chart.    11:13 AM Patient seen and examined at bedside. Ordered for Chlamydia/GC, Syphilis, and HIV AG/AB to evaluate. I discussed with the patient that we will discharge him with another round of Flagyl 500 MG. I discussed plan for discharge and follow up as outlined below. The patient is stable for discharge at this time and will return for any new or worsening symptoms. Patient verbalizes understanding  and support with my plan for discharge.     The patient will return for new or worsening symptoms and is stable at the time of discharge.    DISPOSITION:  Patient will be discharged home in stable condition.  Patient understands he needs to abstain from sex for 2 weeks and take Flagyl for an additional week avoiding alcohol and return if any significant change in symptoms    FINAL IMPRESSION  1. STD exposure          Yanique DELGADO (Scribe), am scribing for, and in the presence of, Monroe Murdock M.D..    Electronically signed by: Yanique Carr (Alonso), 10/6/2022    Monroe DELGADO M.D. personally performed the services described in this documentation, as scribed by Yanique Carr in my presence, and it is both accurate and complete.    The note accurately reflects work and decisions made by me.  Monroe Murdock M.D.  10/6/2022  11:38 AM
